# Patient Record
Sex: MALE | Race: WHITE | URBAN - METROPOLITAN AREA
[De-identification: names, ages, dates, MRNs, and addresses within clinical notes are randomized per-mention and may not be internally consistent; named-entity substitution may affect disease eponyms.]

---

## 2017-02-16 ENCOUNTER — COMMUNICATION - HEALTHEAST (OUTPATIENT)
Dept: INTERNAL MEDICINE | Facility: CLINIC | Age: 57
End: 2017-02-16

## 2017-04-07 ENCOUNTER — COMMUNICATION - HEALTHEAST (OUTPATIENT)
Dept: INTERNAL MEDICINE | Facility: CLINIC | Age: 57
End: 2017-04-07

## 2017-04-07 DIAGNOSIS — Z12.11 SCREEN FOR COLON CANCER: ICD-10-CM

## 2017-06-20 ENCOUNTER — RECORDS - HEALTHEAST (OUTPATIENT)
Dept: ADMINISTRATIVE | Facility: OTHER | Age: 57
End: 2017-06-20

## 2017-08-04 ENCOUNTER — COMMUNICATION - HEALTHEAST (OUTPATIENT)
Dept: INTERNAL MEDICINE | Facility: CLINIC | Age: 57
End: 2017-08-04

## 2017-11-13 ENCOUNTER — COMMUNICATION - HEALTHEAST (OUTPATIENT)
Dept: INTERNAL MEDICINE | Facility: CLINIC | Age: 57
End: 2017-11-13

## 2017-11-15 ENCOUNTER — AMBULATORY - HEALTHEAST (OUTPATIENT)
Dept: FAMILY MEDICINE | Facility: CLINIC | Age: 57
End: 2017-11-15

## 2017-11-15 DIAGNOSIS — R56.9 SEIZURE (H): ICD-10-CM

## 2017-11-17 ENCOUNTER — COMMUNICATION - HEALTHEAST (OUTPATIENT)
Dept: INTERNAL MEDICINE | Facility: CLINIC | Age: 57
End: 2017-11-17

## 2018-02-08 ENCOUNTER — COMMUNICATION - HEALTHEAST (OUTPATIENT)
Dept: INTERNAL MEDICINE | Facility: CLINIC | Age: 58
End: 2018-02-08

## 2018-02-08 DIAGNOSIS — R56.9 SEIZURE (H): ICD-10-CM

## 2018-02-14 ENCOUNTER — COMMUNICATION - HEALTHEAST (OUTPATIENT)
Dept: INTERNAL MEDICINE | Facility: CLINIC | Age: 58
End: 2018-02-14

## 2018-02-14 DIAGNOSIS — R56.9 SEIZURE (H): ICD-10-CM

## 2018-02-15 ENCOUNTER — COMMUNICATION - HEALTHEAST (OUTPATIENT)
Dept: INTERNAL MEDICINE | Facility: CLINIC | Age: 58
End: 2018-02-15

## 2018-02-16 ENCOUNTER — COMMUNICATION - HEALTHEAST (OUTPATIENT)
Dept: INTERNAL MEDICINE | Facility: CLINIC | Age: 58
End: 2018-02-16

## 2018-02-27 ENCOUNTER — OFFICE VISIT - HEALTHEAST (OUTPATIENT)
Dept: INTERNAL MEDICINE | Facility: CLINIC | Age: 58
End: 2018-02-27

## 2018-02-27 DIAGNOSIS — Z79.899 MEDICATION MANAGEMENT: ICD-10-CM

## 2018-02-27 DIAGNOSIS — R56.9 SEIZURE (H): ICD-10-CM

## 2018-02-27 LAB
ALBUMIN SERPL-MCNC: 3.8 G/DL (ref 3.5–5)
ALP SERPL-CCNC: 128 U/L (ref 45–120)
ALT SERPL W P-5'-P-CCNC: 21 U/L (ref 0–45)
ANION GAP SERPL CALCULATED.3IONS-SCNC: 7 MMOL/L (ref 5–18)
AST SERPL W P-5'-P-CCNC: 23 U/L (ref 0–40)
BILIRUB SERPL-MCNC: 0.4 MG/DL (ref 0–1)
BUN SERPL-MCNC: 14 MG/DL (ref 8–22)
CALCIUM SERPL-MCNC: 9.5 MG/DL (ref 8.5–10.5)
CHLORIDE BLD-SCNC: 105 MMOL/L (ref 98–107)
CO2 SERPL-SCNC: 29 MMOL/L (ref 22–31)
CREAT SERPL-MCNC: 0.92 MG/DL (ref 0.7–1.3)
ERYTHROCYTE [DISTWIDTH] IN BLOOD BY AUTOMATED COUNT: 12.4 % (ref 11–14.5)
GFR SERPL CREATININE-BSD FRML MDRD: >60 ML/MIN/1.73M2
GLUCOSE BLD-MCNC: 94 MG/DL (ref 70–125)
HCT VFR BLD AUTO: 44.4 % (ref 40–54)
HGB BLD-MCNC: 15.2 G/DL (ref 14–18)
MCH RBC QN AUTO: 32.6 PG (ref 27–34)
MCHC RBC AUTO-ENTMCNC: 34.2 G/DL (ref 32–36)
MCV RBC AUTO: 95 FL (ref 80–100)
PHENYTOIN SERPL-MCNC: 7.1 UG/ML (ref 10–20)
PLATELET # BLD AUTO: 152 THOU/UL (ref 140–440)
PMV BLD AUTO: 7 FL (ref 7–10)
POTASSIUM BLD-SCNC: 4.7 MMOL/L (ref 3.5–5)
PROT SERPL-MCNC: 7 G/DL (ref 6–8)
RBC # BLD AUTO: 4.65 MILL/UL (ref 4.4–6.2)
SODIUM SERPL-SCNC: 141 MMOL/L (ref 136–145)
WBC: 3.5 THOU/UL (ref 4–11)

## 2018-02-28 LAB — 25(OH)D3 SERPL-MCNC: 17.9 NG/ML (ref 30–80)

## 2018-03-14 ENCOUNTER — COMMUNICATION - HEALTHEAST (OUTPATIENT)
Dept: INTERNAL MEDICINE | Facility: CLINIC | Age: 58
End: 2018-03-14

## 2018-03-14 ENCOUNTER — COMMUNICATION - HEALTHEAST (OUTPATIENT)
Dept: TELEHEALTH | Facility: CLINIC | Age: 58
End: 2018-03-14

## 2018-03-14 ENCOUNTER — AMBULATORY - HEALTHEAST (OUTPATIENT)
Dept: LAB | Facility: CLINIC | Age: 58
End: 2018-03-14

## 2018-03-14 DIAGNOSIS — R56.9 SEIZURE (H): ICD-10-CM

## 2018-03-14 DIAGNOSIS — Z79.899 MEDICATION MANAGEMENT: ICD-10-CM

## 2018-03-14 LAB — PHENYTOIN SERPL-MCNC: 11.5 UG/ML (ref 10–20)

## 2018-03-19 ENCOUNTER — COMMUNICATION - HEALTHEAST (OUTPATIENT)
Dept: INTERNAL MEDICINE | Facility: CLINIC | Age: 58
End: 2018-03-19

## 2018-03-19 DIAGNOSIS — R56.9 SEIZURE (H): ICD-10-CM

## 2018-07-03 ENCOUNTER — OFFICE VISIT - HEALTHEAST (OUTPATIENT)
Dept: INTERNAL MEDICINE | Facility: CLINIC | Age: 58
End: 2018-07-03

## 2018-07-03 DIAGNOSIS — L98.9 SKIN LESION: ICD-10-CM

## 2018-07-03 DIAGNOSIS — R56.9 SEIZURE (H): ICD-10-CM

## 2018-07-03 DIAGNOSIS — Z00.00 HEALTH CARE MAINTENANCE: ICD-10-CM

## 2018-07-03 LAB
ALBUMIN SERPL-MCNC: 4.1 G/DL (ref 3.5–5)
ALP SERPL-CCNC: 94 U/L (ref 45–120)
ALT SERPL W P-5'-P-CCNC: 27 U/L (ref 0–45)
ANION GAP SERPL CALCULATED.3IONS-SCNC: 12 MMOL/L (ref 5–18)
AST SERPL W P-5'-P-CCNC: 24 U/L (ref 0–40)
BILIRUB SERPL-MCNC: 0.4 MG/DL (ref 0–1)
BUN SERPL-MCNC: 18 MG/DL (ref 8–22)
CALCIUM SERPL-MCNC: 9.6 MG/DL (ref 8.5–10.5)
CHLORIDE BLD-SCNC: 104 MMOL/L (ref 98–107)
CO2 SERPL-SCNC: 25 MMOL/L (ref 22–31)
CREAT SERPL-MCNC: 0.81 MG/DL (ref 0.7–1.3)
ERYTHROCYTE [DISTWIDTH] IN BLOOD BY AUTOMATED COUNT: 12.4 % (ref 11–14.5)
GFR SERPL CREATININE-BSD FRML MDRD: >60 ML/MIN/1.73M2
GLUCOSE BLD-MCNC: 73 MG/DL (ref 70–125)
HCT VFR BLD AUTO: 44.6 % (ref 40–54)
HGB BLD-MCNC: 15 G/DL (ref 14–18)
MCH RBC QN AUTO: 32.1 PG (ref 27–34)
MCHC RBC AUTO-ENTMCNC: 33.6 G/DL (ref 32–36)
MCV RBC AUTO: 96 FL (ref 80–100)
PHENYTOIN SERPL-MCNC: 9.9 UG/ML (ref 10–20)
PLATELET # BLD AUTO: 148 THOU/UL (ref 140–440)
PMV BLD AUTO: 7 FL (ref 7–10)
POTASSIUM BLD-SCNC: 4.6 MMOL/L (ref 3.5–5)
PROT SERPL-MCNC: 6.9 G/DL (ref 6–8)
PSA SERPL-MCNC: 0.6 NG/ML (ref 0–3.5)
RBC # BLD AUTO: 4.66 MILL/UL (ref 4.4–6.2)
SODIUM SERPL-SCNC: 141 MMOL/L (ref 136–145)
WBC: 4 THOU/UL (ref 4–11)

## 2018-07-03 ASSESSMENT — MIFFLIN-ST. JEOR: SCORE: 1427.49

## 2018-07-04 LAB
25(OH)D3 SERPL-MCNC: 23.2 NG/ML (ref 30–80)
25(OH)D3 SERPL-MCNC: 23.2 NG/ML (ref 30–80)

## 2018-07-26 ENCOUNTER — COMMUNICATION - HEALTHEAST (OUTPATIENT)
Dept: ADMINISTRATIVE | Facility: CLINIC | Age: 58
End: 2018-07-26

## 2018-11-15 ENCOUNTER — COMMUNICATION - HEALTHEAST (OUTPATIENT)
Dept: INTERNAL MEDICINE | Facility: CLINIC | Age: 58
End: 2018-11-15

## 2018-11-15 DIAGNOSIS — R56.9 SEIZURE (H): ICD-10-CM

## 2018-12-19 ENCOUNTER — COMMUNICATION - HEALTHEAST (OUTPATIENT)
Dept: INTERNAL MEDICINE | Facility: CLINIC | Age: 58
End: 2018-12-19

## 2018-12-19 DIAGNOSIS — R56.9 SEIZURE (H): ICD-10-CM

## 2019-01-26 ENCOUNTER — COMMUNICATION - HEALTHEAST (OUTPATIENT)
Dept: SCHEDULING | Facility: CLINIC | Age: 59
End: 2019-01-26

## 2019-01-26 DIAGNOSIS — R56.9 SEIZURE (H): ICD-10-CM

## 2019-04-20 ENCOUNTER — COMMUNICATION - HEALTHEAST (OUTPATIENT)
Dept: SCHEDULING | Facility: CLINIC | Age: 59
End: 2019-04-20

## 2019-04-20 DIAGNOSIS — R56.9 SEIZURE (H): ICD-10-CM

## 2019-05-19 ENCOUNTER — COMMUNICATION - HEALTHEAST (OUTPATIENT)
Dept: SCHEDULING | Facility: CLINIC | Age: 59
End: 2019-05-19

## 2019-05-19 DIAGNOSIS — R56.9 SEIZURE (H): ICD-10-CM

## 2019-06-18 ENCOUNTER — COMMUNICATION - HEALTHEAST (OUTPATIENT)
Dept: SCHEDULING | Facility: CLINIC | Age: 59
End: 2019-06-18

## 2019-06-18 DIAGNOSIS — R56.9 SEIZURE (H): ICD-10-CM

## 2019-06-19 ENCOUNTER — COMMUNICATION - HEALTHEAST (OUTPATIENT)
Dept: INTERNAL MEDICINE | Facility: CLINIC | Age: 59
End: 2019-06-19

## 2019-06-19 DIAGNOSIS — S42.031A CLOSED DISPLACED FRACTURE OF ACROMIAL END OF RIGHT CLAVICLE, INITIAL ENCOUNTER: ICD-10-CM

## 2019-06-21 ENCOUNTER — OFFICE VISIT - HEALTHEAST (OUTPATIENT)
Dept: INTERNAL MEDICINE | Facility: CLINIC | Age: 59
End: 2019-06-21

## 2019-06-21 ENCOUNTER — RECORDS - HEALTHEAST (OUTPATIENT)
Dept: GENERAL RADIOLOGY | Facility: CLINIC | Age: 59
End: 2019-06-21

## 2019-06-21 DIAGNOSIS — S42.001S CLOSED NONDISPLACED FRACTURE OF RIGHT CLAVICLE, UNSPECIFIED PART OF CLAVICLE, SEQUELA: ICD-10-CM

## 2019-06-21 DIAGNOSIS — S29.9XXD: ICD-10-CM

## 2019-06-21 DIAGNOSIS — S29.9XXD CHEST INJURY, SUBSEQUENT ENCOUNTER: ICD-10-CM

## 2019-07-02 ENCOUNTER — RECORDS - HEALTHEAST (OUTPATIENT)
Dept: ADMINISTRATIVE | Facility: OTHER | Age: 59
End: 2019-07-02

## 2019-07-17 ENCOUNTER — COMMUNICATION - HEALTHEAST (OUTPATIENT)
Dept: SCHEDULING | Facility: CLINIC | Age: 59
End: 2019-07-17

## 2019-07-17 ENCOUNTER — OFFICE VISIT - HEALTHEAST (OUTPATIENT)
Dept: INTERNAL MEDICINE | Facility: CLINIC | Age: 59
End: 2019-07-17

## 2019-07-17 DIAGNOSIS — S42.001K CLOSED DISPLACED FRACTURE OF RIGHT CLAVICLE WITH NONUNION, UNSPECIFIED PART OF CLAVICLE, SUBSEQUENT ENCOUNTER: ICD-10-CM

## 2019-07-17 DIAGNOSIS — G40.909 NONINTRACTABLE EPILEPSY WITHOUT STATUS EPILEPTICUS, UNSPECIFIED EPILEPSY TYPE (H): ICD-10-CM

## 2019-07-17 DIAGNOSIS — R56.9 SEIZURE (H): ICD-10-CM

## 2019-07-17 DIAGNOSIS — Z01.818 PREOPERATIVE EXAMINATION: ICD-10-CM

## 2019-07-17 LAB
ALBUMIN SERPL-MCNC: 4.2 G/DL (ref 3.5–5)
ALP SERPL-CCNC: 132 U/L (ref 45–120)
ALT SERPL W P-5'-P-CCNC: 30 U/L (ref 0–45)
ANION GAP SERPL CALCULATED.3IONS-SCNC: 9 MMOL/L (ref 5–18)
AST SERPL W P-5'-P-CCNC: 23 U/L (ref 0–40)
BILIRUB SERPL-MCNC: 0.3 MG/DL (ref 0–1)
BUN SERPL-MCNC: 13 MG/DL (ref 8–22)
CALCIUM SERPL-MCNC: 9.9 MG/DL (ref 8.5–10.5)
CHLORIDE BLD-SCNC: 103 MMOL/L (ref 98–107)
CO2 SERPL-SCNC: 30 MMOL/L (ref 22–31)
CREAT SERPL-MCNC: 0.82 MG/DL (ref 0.7–1.3)
ERYTHROCYTE [DISTWIDTH] IN BLOOD BY AUTOMATED COUNT: 11.9 % (ref 11–14.5)
GFR SERPL CREATININE-BSD FRML MDRD: >60 ML/MIN/1.73M2
GLUCOSE BLD-MCNC: 93 MG/DL (ref 70–125)
HCT VFR BLD AUTO: 43.7 % (ref 40–54)
HGB BLD-MCNC: 14.8 G/DL (ref 14–18)
MCH RBC QN AUTO: 32.3 PG (ref 27–34)
MCHC RBC AUTO-ENTMCNC: 33.7 G/DL (ref 32–36)
MCV RBC AUTO: 96 FL (ref 80–100)
PHENYTOIN SERPL-MCNC: 22.8 UG/ML (ref 10–20)
PLATELET # BLD AUTO: 141 THOU/UL (ref 140–440)
PMV BLD AUTO: 7.2 FL (ref 7–10)
POTASSIUM BLD-SCNC: 5 MMOL/L (ref 3.5–5)
PROT SERPL-MCNC: 6.9 G/DL (ref 6–8)
RBC # BLD AUTO: 4.57 MILL/UL (ref 4.4–6.2)
SODIUM SERPL-SCNC: 142 MMOL/L (ref 136–145)
WBC: 4.4 THOU/UL (ref 4–11)

## 2019-07-17 ASSESSMENT — MIFFLIN-ST. JEOR: SCORE: 1418.19

## 2019-07-18 ENCOUNTER — COMMUNICATION - HEALTHEAST (OUTPATIENT)
Dept: INTERNAL MEDICINE | Facility: CLINIC | Age: 59
End: 2019-07-18

## 2019-08-01 ENCOUNTER — RECORDS - HEALTHEAST (OUTPATIENT)
Dept: ADMINISTRATIVE | Facility: OTHER | Age: 59
End: 2019-08-01

## 2019-08-13 ENCOUNTER — RECORDS - HEALTHEAST (OUTPATIENT)
Dept: ADMINISTRATIVE | Facility: OTHER | Age: 59
End: 2019-08-13

## 2019-08-18 ENCOUNTER — COMMUNICATION - HEALTHEAST (OUTPATIENT)
Dept: SCHEDULING | Facility: CLINIC | Age: 59
End: 2019-08-18

## 2019-08-18 DIAGNOSIS — R56.9 SEIZURE (H): ICD-10-CM

## 2019-09-19 ENCOUNTER — COMMUNICATION - HEALTHEAST (OUTPATIENT)
Dept: SCHEDULING | Facility: CLINIC | Age: 59
End: 2019-09-19

## 2019-09-19 DIAGNOSIS — R56.9 SEIZURE (H): ICD-10-CM

## 2019-09-24 ENCOUNTER — RECORDS - HEALTHEAST (OUTPATIENT)
Dept: ADMINISTRATIVE | Facility: OTHER | Age: 59
End: 2019-09-24

## 2019-11-08 ENCOUNTER — COMMUNICATION - HEALTHEAST (OUTPATIENT)
Dept: INTERNAL MEDICINE | Facility: CLINIC | Age: 59
End: 2019-11-08

## 2019-11-26 ENCOUNTER — RECORDS - HEALTHEAST (OUTPATIENT)
Dept: ADMINISTRATIVE | Facility: OTHER | Age: 59
End: 2019-11-26

## 2019-12-13 ENCOUNTER — OFFICE VISIT - HEALTHEAST (OUTPATIENT)
Dept: INTERNAL MEDICINE | Facility: CLINIC | Age: 59
End: 2019-12-13

## 2019-12-13 DIAGNOSIS — Z23 NEED FOR INFLUENZA VACCINATION: ICD-10-CM

## 2019-12-13 DIAGNOSIS — Z00.00 HEALTHCARE MAINTENANCE: ICD-10-CM

## 2019-12-13 DIAGNOSIS — Z00.00 ANNUAL PHYSICAL EXAM: ICD-10-CM

## 2019-12-13 DIAGNOSIS — G40.909 NONINTRACTABLE EPILEPSY WITHOUT STATUS EPILEPTICUS, UNSPECIFIED EPILEPSY TYPE (H): ICD-10-CM

## 2019-12-13 LAB
LDLC SERPL CALC-MCNC: 94 MG/DL
PSA SERPL-MCNC: 0.5 NG/ML (ref 0–3.5)

## 2019-12-13 ASSESSMENT — MIFFLIN-ST. JEOR: SCORE: 1442.68

## 2020-05-21 ENCOUNTER — COMMUNICATION - HEALTHEAST (OUTPATIENT)
Dept: SCHEDULING | Facility: CLINIC | Age: 60
End: 2020-05-21

## 2020-05-26 ENCOUNTER — OFFICE VISIT - HEALTHEAST (OUTPATIENT)
Dept: INTERNAL MEDICINE | Facility: CLINIC | Age: 60
End: 2020-05-26

## 2020-05-26 DIAGNOSIS — G40.909 NONINTRACTABLE EPILEPSY WITHOUT STATUS EPILEPTICUS, UNSPECIFIED EPILEPSY TYPE (H): ICD-10-CM

## 2020-09-14 ENCOUNTER — COMMUNICATION - HEALTHEAST (OUTPATIENT)
Dept: SCHEDULING | Facility: CLINIC | Age: 60
End: 2020-09-14

## 2020-09-14 DIAGNOSIS — R56.9 SEIZURE (H): ICD-10-CM

## 2020-09-16 RX ORDER — PHENYTOIN SODIUM 100 MG/1
CAPSULE, EXTENDED RELEASE ORAL
Qty: 270 CAPSULE | Refills: 3 | Status: SHIPPED | OUTPATIENT
Start: 2020-09-16 | End: 2022-09-14

## 2021-05-23 ENCOUNTER — HEALTH MAINTENANCE LETTER (OUTPATIENT)
Age: 61
End: 2021-05-23

## 2021-05-28 NOTE — TELEPHONE ENCOUNTER
RN cannot approve Refill Request    RN can NOT refill this medication overdue for office visits and/or labs.    Bryant Nails, [facility] Triage/Med Refill 4/22/2019    Requested Prescriptions   Pending Prescriptions Disp Refills     DILANTIN EXTENDED 100 mg ER capsule [Pharmacy Med Name: DILANTIN 100MG CAPSULES] 90 capsule 0     Sig: TAKE 3 CAPSULES BY MOUTH ONCE DAILY       Phenytoin Refill Protocol Failed - 4/20/2019  9:03 AM        Failed - Free phenytoin level in last 12 months     No results found for: PHENYTOIFREE          Failed - Folate level in last 12 months     No results found for: FOLATE          Passed - LFT or AST or ALT in last 12 months     Albumin   Date Value Ref Range Status   07/03/2018 4.1 3.5 - 5.0 g/dL Final     Bilirubin, Total   Date Value Ref Range Status   07/03/2018 0.4 0.0 - 1.0 mg/dL Final     Alkaline Phosphatase   Date Value Ref Range Status   07/03/2018 94 45 - 120 U/L Final     AST   Date Value Ref Range Status   07/03/2018 24 0 - 40 U/L Final     ALT   Date Value Ref Range Status   07/03/2018 27 0 - 45 U/L Final     Protein, Total   Date Value Ref Range Status   07/03/2018 6.9 6.0 - 8.0 g/dL Final                Passed - CBC w/o diff (Hemogram 2) in last year      WBC   Date Value Ref Range Status   07/03/2018 4.0 4.0 - 11.0 thou/uL Final     RBC   Date Value Ref Range Status   07/03/2018 4.66 4.40 - 6.20 mill/uL Final     Hemoglobin   Date Value Ref Range Status   07/03/2018 15.0 14.0 - 18.0 g/dL Final     Hematocrit   Date Value Ref Range Status   07/03/2018 44.6 40.0 - 54.0 % Final     MCV   Date Value Ref Range Status   07/03/2018 96 80 - 100 fL Final     MCH   Date Value Ref Range Status   07/03/2018 32.1 27.0 - 34.0 pg Final     MCHC   Date Value Ref Range Status   07/03/2018 33.6 32.0 - 36.0 g/dL Final     RDW   Date Value Ref Range Status   07/03/2018 12.4 11.0 - 14.5 % Final     Platelets   Date Value Ref Range Status   07/03/2018 148 140 - 440 thou/uL Final      MPV   Date Value Ref Range Status   07/03/2018 7.0 7.0 - 10.0 fL Final                Passed - PCP or prescribing provider visit in past 12 months       Last office visit with prescriber/PCP: Visit date not found OR same dept: Visit date not found OR same specialty: Visit date not found  Last physical: 7/3/2018 Last MTM visit: Visit date not found   Next visit within 3 mo: Visit date not found  Next physical within 3 mo: Visit date not found  Prescriber OR PCP: Debora Ring MD  Last diagnosis associated with med order: 1. Seizure (H)  - DILANTIN EXTENDED 100 mg ER capsule [Pharmacy Med Name: DILANTIN 100MG CAPSULES]; TAKE 3 CAPSULES BY MOUTH ONCE DAILY  Dispense: 90 capsule; Refill: 0    If protocol passes may refill for 12 months if within 3 months of last provider visit (or a total of 15 months).

## 2021-05-28 NOTE — TELEPHONE ENCOUNTER
RN cannot approve Refill Request    RN can NOT refill this medication Protocol failed and NO refill given.         Kalie Jones, [facility] Triage/Med Refill 5/20/2019    Requested Prescriptions   Pending Prescriptions Disp Refills     DILANTIN EXTENDED 100 mg ER capsule [Pharmacy Med Name: DILANTIN 100MG CAPSULES] 90 capsule 0     Sig: TAKE 3 CAPSULES BY MOUTH ONCE DAILY       Phenytoin Refill Protocol Failed - 5/19/2019  8:11 AM        Failed - Free phenytoin level in last 12 months     No results found for: PHENYTOIFREE          Failed - Folate level in last 12 months     No results found for: FOLATE          Passed - LFT or AST or ALT in last 12 months     Albumin   Date Value Ref Range Status   07/03/2018 4.1 3.5 - 5.0 g/dL Final     Bilirubin, Total   Date Value Ref Range Status   07/03/2018 0.4 0.0 - 1.0 mg/dL Final     Alkaline Phosphatase   Date Value Ref Range Status   07/03/2018 94 45 - 120 U/L Final     AST   Date Value Ref Range Status   07/03/2018 24 0 - 40 U/L Final     ALT   Date Value Ref Range Status   07/03/2018 27 0 - 45 U/L Final     Protein, Total   Date Value Ref Range Status   07/03/2018 6.9 6.0 - 8.0 g/dL Final                Passed - CBC w/o diff (Hemogram 2) in last year      WBC   Date Value Ref Range Status   07/03/2018 4.0 4.0 - 11.0 thou/uL Final     RBC   Date Value Ref Range Status   07/03/2018 4.66 4.40 - 6.20 mill/uL Final     Hemoglobin   Date Value Ref Range Status   07/03/2018 15.0 14.0 - 18.0 g/dL Final     Hematocrit   Date Value Ref Range Status   07/03/2018 44.6 40.0 - 54.0 % Final     MCV   Date Value Ref Range Status   07/03/2018 96 80 - 100 fL Final     MCH   Date Value Ref Range Status   07/03/2018 32.1 27.0 - 34.0 pg Final     MCHC   Date Value Ref Range Status   07/03/2018 33.6 32.0 - 36.0 g/dL Final     RDW   Date Value Ref Range Status   07/03/2018 12.4 11.0 - 14.5 % Final     Platelets   Date Value Ref Range Status   07/03/2018 148 140 - 440 thou/uL Final      MPV   Date Value Ref Range Status   07/03/2018 7.0 7.0 - 10.0 fL Final                Passed - PCP or prescribing provider visit in past 12 months       Last office visit with prescriber/PCP: Visit date not found OR same dept: Visit date not found OR same specialty: Visit date not found  Last physical: 7/3/2018 Last MTM visit: Visit date not found   Next visit within 3 mo: Visit date not found  Next physical within 3 mo: Visit date not found  Prescriber OR PCP: Debora Ring MD  Last diagnosis associated with med order: 1. Seizure (H)  - DILANTIN EXTENDED 100 mg ER capsule [Pharmacy Med Name: DILANTIN 100MG CAPSULES]; TAKE 3 CAPSULES BY MOUTH ONCE DAILY  Dispense: 90 capsule; Refill: 0    If protocol passes may refill for 12 months if within 3 months of last provider visit (or a total of 15 months).

## 2021-05-29 NOTE — TELEPHONE ENCOUNTER
Controlled Substance Refill Request  Medication Name:   Requested Prescriptions     Pending Prescriptions Disp Refills     oxyCODONE-acetaminophen (PERCOCET/ENDOCET) 5-325 mg per tablet 13 tablet 0     Sig: Take 1 tablet by mouth every 6 (six) hours as needed for pain.     Date Last Fill: 6/8/2019  Pharmacy: [facility] [address] [place] [address]       Submit electronically to pharmacy  Controlled Substance Agreement Date Scanned:   Encounter-Level CSA Scan Date:    There are no encounter-level csa scan date.       Last office visit with prescriber/PCP: Visit date not found OR same dept: Visit date not found OR same specialty: 2/27/2018 Osiel Olson CNP  Last physical: 7/3/2018 Last MTM visit: Visit date not found      The patient would like to demetrio the request as high priority due to the patient being out of the medication. The patient states that his pain is still 8/10 at night. The patient states that he is taking Aleve during the day and it is helping some and rates his pain 5-6/10.

## 2021-05-29 NOTE — TELEPHONE ENCOUNTER
RN cannot approve Refill Request    RN can NOT refill this medication overdue for office visits and/or labs.    Bryant Nails, [facility] Triage/Med Refill 6/18/2019    Requested Prescriptions   Pending Prescriptions Disp Refills     DILANTIN EXTENDED 100 mg ER capsule [Pharmacy Med Name: DILANTIN 100MG CAPSULES] 90 capsule 0     Sig: TAKE 3 CAPSULES BY MOUTH ONCE DAILY       Phenytoin Refill Protocol Failed - 6/18/2019  6:23 AM        Failed - Free phenytoin level in last 12 months     No results found for: PHENYTOIFREE          Failed - Folate level in last 12 months     No results found for: FOLATE          Passed - LFT or AST or ALT in last 12 months     Albumin   Date Value Ref Range Status   07/03/2018 4.1 3.5 - 5.0 g/dL Final     Bilirubin, Total   Date Value Ref Range Status   07/03/2018 0.4 0.0 - 1.0 mg/dL Final     Alkaline Phosphatase   Date Value Ref Range Status   07/03/2018 94 45 - 120 U/L Final     AST   Date Value Ref Range Status   07/03/2018 24 0 - 40 U/L Final     ALT   Date Value Ref Range Status   07/03/2018 27 0 - 45 U/L Final     Protein, Total   Date Value Ref Range Status   07/03/2018 6.9 6.0 - 8.0 g/dL Final                Passed - CBC w/o diff (Hemogram 2) in last year      WBC   Date Value Ref Range Status   07/03/2018 4.0 4.0 - 11.0 thou/uL Final     RBC   Date Value Ref Range Status   07/03/2018 4.66 4.40 - 6.20 mill/uL Final     Hemoglobin   Date Value Ref Range Status   07/03/2018 15.0 14.0 - 18.0 g/dL Final     Hematocrit   Date Value Ref Range Status   07/03/2018 44.6 40.0 - 54.0 % Final     MCV   Date Value Ref Range Status   07/03/2018 96 80 - 100 fL Final     MCH   Date Value Ref Range Status   07/03/2018 32.1 27.0 - 34.0 pg Final     MCHC   Date Value Ref Range Status   07/03/2018 33.6 32.0 - 36.0 g/dL Final     RDW   Date Value Ref Range Status   07/03/2018 12.4 11.0 - 14.5 % Final     Platelets   Date Value Ref Range Status   07/03/2018 148 140 - 440 thou/uL Final      MPV   Date Value Ref Range Status   07/03/2018 7.0 7.0 - 10.0 fL Final                Passed - PCP or prescribing provider visit in past 12 months       Last office visit with prescriber/PCP: Visit date not found OR same dept: Visit date not found OR same specialty: Visit date not found  Last physical: 7/3/2018 Last MTM visit: Visit date not found   Next visit within 3 mo: Visit date not found  Next physical within 3 mo: Visit date not found  Prescriber OR PCP: Debora Ring MD  Last diagnosis associated with med order: 1. Seizure (H)  - DILANTIN EXTENDED 100 mg ER capsule [Pharmacy Med Name: DILANTIN 100MG CAPSULES]; TAKE 3 CAPSULES BY MOUTH ONCE DAILY  Dispense: 90 capsule; Refill: 0    If protocol passes may refill for 12 months if within 3 months of last provider visit (or a total of 15 months).

## 2021-05-30 NOTE — PATIENT INSTRUCTIONS - HE
Hold all supplements, aspirin and NSAIDs for 7 days prior to surgery.  Follow your surgeon's direction on when to stop eating and drinking prior to surgery.  Your surgeon will be managing your pain after your surgery.        Take Dilantin with small sip of water on the morning of surgery.   intermittent

## 2021-05-30 NOTE — PROGRESS NOTES
Preoperative Exam    Scheduled Procedure5:Right Clavicle Fracture  Surgery Date:  7-  Surgery Location: [facility], fax [phone]    Surgeon:      Assessment/Plan:     1. Preoperative examination    - HM2(CBC w/o Differential)  - Comprehensive Metabolic Panel    2. 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 He was using arm sling to immobilize the fracture. He saw [facility] and surgery is recommended because of the nonunion. Pain is controlled with occasional use of Percocet. Significant restriction in range of motion of the right shoulder and arm.    3. Nonintractable epilepsy without status epilepticus, unspecified epilepsy type (H)  On dilantin, will take am of surgery.  - Phenytoin (Dilantin )     Surgical Procedure Risk: Low (reported cardiac risk generally < 1%)  Have you had prior anesthesia?: Yes  Have you or any family members had a previous anesthesia reaction:  No  Do you or any family members have a history of a clotting or bleeding disorder?:No   Cardiac Risk Assessment: no increased risk for major cardiac complications    APPROVAL GIVEN to proceed with proposed procedure, without further diagnostic evaluation        Functional Status: Independent  Patient plans to recover at home with family.     Subjective:      Zaki Sultana is a 58 y.o. male who presents for a preoperative consultation.  See above.    All other systems reviewed and are negative, other than those listed in the HPI.    Pertinent History  Do you have difficulty breathing or chest pain after walking up a flight of stairs: No  History of obstructive sleep apnea: No  Steroid use in the last 6 months: No  Frequent Aspirin/NSAID use: No  Prior Blood Transfusion: Yes: In Mid 70's Past Surgry  Prior Blood Transfusion Reaction: No  If for some  reason prior to, during or after the procedure, if it is medically indicated, would you be willing to have a blood transfusion?:  There is no transfusion refusal.    Current Outpatient Medications   Medication Sig Dispense Refill     DILANTIN EXTENDED 100 mg ER capsule TAKE 3 CAPSULES BY MOUTH ONCE DAILY 90 capsule 0     oxyCODONE-acetaminophen (PERCOCET/ENDOCET) 5-325 mg per tablet Take 1 tablet by mouth every 6 (six) hours as needed for pain. 13 tablet 0     No current facility-administered medications for this visit.         Allergies   Allergen Reactions     Penicillins      Phenytoin Sodium Extended      Seizures  - Only there generic causes this       Patient Active Problem List   Diagnosis     Epilepsy       Past Medical History:   Diagnosis Date     Benign Polyps Of The Large Intestine     Created by Conversion [facility] Annotation: Apr 20 2011 10:27AM - Debora Ring: colonoscopy  done 4/15/11      Epilepsy     Created by Conversion        Past Surgical History:   Procedure Laterality Date     INTRACRANIAL ANEURYSM REPAIR         Social History     Socioeconomic History     Marital status:      Spouse name: Not on file     Number of children: Not on file     Years of education: Not on file     Highest education level: Not on file   Occupational History     Not on file   Social Needs     Financial resource strain: Not on file     Food insecurity:     Worry: Not on file     Inability: Not on file     Transportation needs:     Medical: Not on file     Non-medical: Not on file   Tobacco Use     Smoking status: Never Smoker     Smokeless tobacco: Never Used   Substance and Sexual Activity     Alcohol use: Yes     Alcohol/week: 0.6 - 1.2 oz     Types: 1 - 2 Cans of beer per week     Drug use: No     Sexual activity: Yes     Partners: Female     Birth control/protection: None   Lifestyle     Physical activity:     Days per week: Not on file     Minutes per session: Not on file     Stress: Not on file  "  Relationships     Social connections:     Talks on phone: Not on file     Gets together: Not on file     Attends Alevism service: Not on file     Active member of club or organization: Not on file     Attends meetings of clubs or organizations: Not on file     Relationship status: Not on file     Intimate partner violence:     Fear of current or ex partner: Not on file     Emotionally abused: Not on file     Physically abused: Not on file     Forced sexual activity: Not on file   Other Topics Concern     Not on file   Social History Narrative     Not on file             Objective:     Vitals:    07/17/19 1240   BP: 103/68   Pulse: 60   SpO2: 98%   Weight: 138 lb 9.6 oz (62.9 kg)   Height: 5' 8\" (1.727 m)         Physical Exam:  Constitutional:  oriented to person, place, and time, appears well-nourished. No distress.   HENT:   Head: Normocephalic.   Mouth/Throat: Oropharynx is clear and moist.   Eyes: Conjunctivae are normal. Pupils are equal, round, and reactive to light.   Neck: Normal range of motion. Neck supple.   Cardiovascular: Normal rate, regular rhythm and normal heart sounds.    Pulmonary/Chest: Effort normal and breath sounds normal.   Abdominal: Soft. Bowel sounds are normal.   Musculoskeletal: Normal range of motion.   Neurological: alert and oriented to person, place, and time. Skin: Skin is warm.   Psychiatric: normal mood and affect.      Patient Instructions     Hold all supplements, aspirin and NSAIDs for 7 days prior to surgery.  Follow your surgeon's direction on when to stop eating and drinking prior to surgery.  Your surgeon will be managing your pain after your surgery.        Take Dilantin with small sip of water on the morning of surgery.          Labs:  Labs pending at this time.  Results will be reviewed when available.    Immunization History   Administered Date(s) Administered     Influenza, Seasonal, Inj PF IIV3 11/03/2011     Td,adult,historic,unspecified 09/09/2008     Tdap " 09/09/2008, 05/04/2015           Electronically signed by Debora Ring MD 07/17/19 8:40 AM

## 2021-05-30 NOTE — TELEPHONE ENCOUNTER
RN cannot approve Refill Request    RN can NOT refill this medication Protocol failed and NO refill given.      Kalie Jones, [facility] Triage/Med Refill 7/17/2019    Requested Prescriptions   Pending Prescriptions Disp Refills     DILANTIN EXTENDED 100 mg ER capsule [Pharmacy Med Name: DILANTIN 100MG CAPSULES] 90 capsule 0     Sig: TAKE 3 CAPSULES BY MOUTH ONCE DAILY       Phenytoin Refill Protocol Failed - 7/17/2019  5:26 AM        Failed - LFT or AST or ALT in last 12 months     Albumin   Date Value Ref Range Status   07/03/2018 4.1 3.5 - 5.0 g/dL Final     Bilirubin, Total   Date Value Ref Range Status   07/03/2018 0.4 0.0 - 1.0 mg/dL Final     Alkaline Phosphatase   Date Value Ref Range Status   07/03/2018 94 45 - 120 U/L Final     AST   Date Value Ref Range Status   07/03/2018 24 0 - 40 U/L Final     ALT   Date Value Ref Range Status   07/03/2018 27 0 - 45 U/L Final     Protein, Total   Date Value Ref Range Status   07/03/2018 6.9 6.0 - 8.0 g/dL Final                Failed - CBC w/o diff (Hemogram 2) in last year      WBC   Date Value Ref Range Status   07/03/2018 4.0 4.0 - 11.0 thou/uL Final     RBC   Date Value Ref Range Status   07/03/2018 4.66 4.40 - 6.20 mill/uL Final     Hemoglobin   Date Value Ref Range Status   07/03/2018 15.0 14.0 - 18.0 g/dL Final     Hematocrit   Date Value Ref Range Status   07/03/2018 44.6 40.0 - 54.0 % Final     MCV   Date Value Ref Range Status   07/03/2018 96 80 - 100 fL Final     MCH   Date Value Ref Range Status   07/03/2018 32.1 27.0 - 34.0 pg Final     MCHC   Date Value Ref Range Status   07/03/2018 33.6 32.0 - 36.0 g/dL Final     RDW   Date Value Ref Range Status   07/03/2018 12.4 11.0 - 14.5 % Final     Platelets   Date Value Ref Range Status   07/03/2018 148 140 - 440 thou/uL Final     MPV   Date Value Ref Range Status   07/03/2018 7.0 7.0 - 10.0 fL Final                Failed - Free phenytoin level in last 12 months     No results found for: PHENYTOIFREE           Failed - Folate level in last 12 months     No results found for: FOLATE          Passed - PCP or prescribing provider visit in past 12 months       Last office visit with prescriber/PCP: 6/21/2019 Debora Ring MD OR same dept: Visit date not found OR same specialty: Visit date not found  Last physical: 7/3/2018 Last MTM visit: Visit date not found   Next visit within 3 mo: Visit date not found  Next physical within 3 mo: 7/17/2019 Debora Ring MD  Prescriber OR PCP: Debora Ring MD  Last diagnosis associated with med order: 1. Seizure (H)  - DILANTIN EXTENDED 100 mg ER capsule [Pharmacy Med Name: DILANTIN 100MG CAPSULES]; TAKE 3 CAPSULES BY MOUTH ONCE DAILY  Dispense: 90 capsule; Refill: 0    If protocol passes may refill for 12 months if within 3 months of last provider visit (or a total of 15 months).

## 2021-05-31 NOTE — TELEPHONE ENCOUNTER
RN cannot approve Refill Request    RN can NOT refill this medication Protocol failed and NO refill given. Last office visit: 6/21/2019 Debora Ring MD Last Physical: 7/17/2019 Last MTM visit: Visit date not found Last visit same specialty: Visit date not found.  Next visit within 3 mo: Visit date not found  Next physical within 3 mo: Visit date not found      Zee Acevedo, [facility] Triage/Med Refill 8/19/2019    Requested Prescriptions   Pending Prescriptions Disp Refills     DILANTIN EXTENDED 100 mg ER capsule [Pharmacy Med Name: DILANTIN 100MG CAPSULES] 90 capsule 0     Sig: TAKE 3 CAPSULES BY MOUTH ONCE DAILY       Phenytoin Refill Protocol Failed - 8/18/2019  4:20 PM        Failed - Free phenytoin level in last 12 months     No results found for: PHENYTOIFREE          Failed - Folate level in last 12 months     No results found for: FOLATE          Passed - LFT or AST or ALT in last 12 months     Albumin   Date Value Ref Range Status   07/17/2019 4.2 3.5 - 5.0 g/dL Final     Bilirubin, Total   Date Value Ref Range Status   07/17/2019 0.3 0.0 - 1.0 mg/dL Final     Alkaline Phosphatase   Date Value Ref Range Status   07/17/2019 132 (H) 45 - 120 U/L Final     AST   Date Value Ref Range Status   07/17/2019 23 0 - 40 U/L Final     ALT   Date Value Ref Range Status   07/17/2019 30 0 - 45 U/L Final     Protein, Total   Date Value Ref Range Status   07/17/2019 6.9 6.0 - 8.0 g/dL Final                Passed - CBC w/o diff (Hemogram 2) in last year      WBC   Date Value Ref Range Status   07/17/2019 4.4 4.0 - 11.0 thou/uL Final     RBC   Date Value Ref Range Status   07/17/2019 4.57 4.40 - 6.20 mill/uL Final     Hemoglobin   Date Value Ref Range Status   07/17/2019 14.8 14.0 - 18.0 g/dL Final     Hematocrit   Date Value Ref Range Status   07/17/2019 43.7 40.0 - 54.0 % Final     MCV   Date Value Ref Range Status   07/17/2019 96 80 - 100 fL Final     MCH   Date Value Ref Range Status   07/17/2019 32.3 27.0  - 34.0 pg Final     MCHC   Date Value Ref Range Status   07/17/2019 33.7 32.0 - 36.0 g/dL Final     RDW   Date Value Ref Range Status   07/17/2019 11.9 11.0 - 14.5 % Final     Platelets   Date Value Ref Range Status   07/17/2019 141 140 - 440 thou/uL Final     MPV   Date Value Ref Range Status   07/17/2019 7.2 7.0 - 10.0 fL Final                Passed - PCP or prescribing provider visit in past 12 months       Last office visit with prescriber/PCP: 6/21/2019 Debora Ring MD OR same dept: Visit date not found OR same specialty: Visit date not found  Last physical: 7/17/2019 Last MTM visit: Visit date not found   Next visit within 3 mo: Visit date not found  Next physical within 3 mo: Visit date not found  Prescriber OR PCP: Debora Ring MD  Last diagnosis associated with med order: 1. Seizure (H)  - DILANTIN EXTENDED 100 mg ER capsule [Pharmacy Med Name: DILANTIN 100MG CAPSULES]; TAKE 3 CAPSULES BY MOUTH ONCE DAILY  Dispense: 90 capsule; Refill: 0    If protocol passes may refill for 12 months if within 3 months of last provider visit (or a total of 15 months).

## 2021-06-01 VITALS — HEIGHT: 67 IN | BODY MASS INDEX: 22.9 KG/M2 | WEIGHT: 145.9 LBS

## 2021-06-01 VITALS — WEIGHT: 151.6 LBS | BODY MASS INDEX: 23.74 KG/M2

## 2021-06-01 NOTE — TELEPHONE ENCOUNTER
RN cannot approve Refill Request    RN can NOT refill this medication Protocol failed and NO refill given.       Kalie Jones, [facility] Triage/Med Refill 9/19/2019    Requested Prescriptions   Pending Prescriptions Disp Refills     DILANTIN EXTENDED 100 mg ER capsule [Pharmacy Med Name: DILANTIN 100MG CAPSULES] 90 capsule 0     Sig: TAKE 3 CAPSULES BY MOUTH ONCE DAILY       Phenytoin Refill Protocol Failed - 9/19/2019  6:19 AM        Failed - Free phenytoin level in last 12 months     No results found for: PHENYTOIFREE          Failed - Folate level in last 12 months     No results found for: FOLATE          Passed - LFT or AST or ALT in last 12 months     Albumin   Date Value Ref Range Status   07/17/2019 4.2 3.5 - 5.0 g/dL Final     Bilirubin, Total   Date Value Ref Range Status   07/17/2019 0.3 0.0 - 1.0 mg/dL Final     Alkaline Phosphatase   Date Value Ref Range Status   07/17/2019 132 (H) 45 - 120 U/L Final     AST   Date Value Ref Range Status   07/17/2019 23 0 - 40 U/L Final     ALT   Date Value Ref Range Status   07/17/2019 30 0 - 45 U/L Final     Protein, Total   Date Value Ref Range Status   07/17/2019 6.9 6.0 - 8.0 g/dL Final                Passed - CBC w/o diff (Hemogram 2) in last year      WBC   Date Value Ref Range Status   07/17/2019 4.4 4.0 - 11.0 thou/uL Final     RBC   Date Value Ref Range Status   07/17/2019 4.57 4.40 - 6.20 mill/uL Final     Hemoglobin   Date Value Ref Range Status   07/17/2019 14.8 14.0 - 18.0 g/dL Final     Hematocrit   Date Value Ref Range Status   07/17/2019 43.7 40.0 - 54.0 % Final     MCV   Date Value Ref Range Status   07/17/2019 96 80 - 100 fL Final     MCH   Date Value Ref Range Status   07/17/2019 32.3 27.0 - 34.0 pg Final     MCHC   Date Value Ref Range Status   07/17/2019 33.7 32.0 - 36.0 g/dL Final     RDW   Date Value Ref Range Status   07/17/2019 11.9 11.0 - 14.5 % Final     Platelets   Date Value Ref Range Status   07/17/2019 141 140 - 440 thou/uL Final      MPV   Date Value Ref Range Status   07/17/2019 7.2 7.0 - 10.0 fL Final                Passed - PCP or prescribing provider visit in past 12 months       Last office visit with prescriber/PCP: 2/27/2018 Osiel Olson CNP OR same dept: Visit date not found OR same specialty: Visit date not found  Last physical: Visit date not found Last MTM visit: Visit date not found   Next visit within 3 mo: Visit date not found  Next physical within 3 mo: Visit date not found  Prescriber OR PCP: Osiel Olson CNP  Last diagnosis associated with med order: 1. Seizure (H)  - DILANTIN EXTENDED 100 mg ER capsule [Pharmacy Med Name: DILANTIN 100MG CAPSULES]; TAKE 3 CAPSULES BY MOUTH ONCE DAILY  Dispense: 90 capsule; Refill: 0    If protocol passes may refill for 12 months if within 3 months of last provider visit (or a total of 15 months).

## 2021-06-03 VITALS — BODY MASS INDEX: 21.74 KG/M2 | WEIGHT: 143 LBS

## 2021-06-03 VITALS — BODY MASS INDEX: 21.01 KG/M2 | HEIGHT: 68 IN | WEIGHT: 138.6 LBS

## 2021-06-04 VITALS
HEART RATE: 64 BPM | SYSTOLIC BLOOD PRESSURE: 110 MMHG | WEIGHT: 144 LBS | DIASTOLIC BLOOD PRESSURE: 62 MMHG | BODY MASS INDEX: 21.82 KG/M2 | HEIGHT: 68 IN

## 2021-06-04 NOTE — PROGRESS NOTES
Assessment:   1. Annual physical exam  Generally healthy 59-year-old male.  Chronic medical conditions discussed today.    2. Healthcare maintenance  Colonoscopy due in 2022.  Fasting LDL check today.  Check PSA.  We reviewed his CBC and CMP from July this last year.  He will receive his influenza vaccine today    3. Nonintractable epilepsy without status epilepticus, unspecified epilepsy type (H)  Stable on Dilantin.  Had PCP check Dilantin levels this past July    4. Need for influenza vaccination  - Influenza, Recombinant, Inj, Quadrivalent, PF, 18+YRS         Plan:     Patient Instructions   Flu shot today.    Call your insurance company and inquire about the Shingrix vaccine for shingles.    We will check LDL cholesterol and PSA today.    Labs from this past July reviewed and found to be normal.    Dilantin labs from last July normal.    Weight looks good.    Vital signs look good.    Colonoscopy due in 2022.    Follow-up in 1 year, sooner if needed.      Subjective:     Here for physical exam. Chart reviewed       Past Medical History:   Diagnosis Date     Benign Polyps Of The Large Intestine     Created by [employer] [facility] Annotation: Apr 20 2011 10:27AM - Debora Ring: colonoscopy  done 4/15/11      Epilepsy     Created by Conversion      Past Surgical History:   Procedure Laterality Date     INTRACRANIAL ANEURYSM REPAIR       Penicillins and Phenytoin sodium extended  Family History   Problem Relation Age of Onset     Diabetes Mother      Stroke Mother      Hypertension Mother      Diabetes Father      Social History     Socioeconomic History     Marital status:      Spouse name: Not on file     Number of children: Not on file     Years of education: Not on file     Highest education level: Not on file   Occupational History     Not on file   Social Needs     Financial resource strain: Not on file     Food insecurity:     Worry: Not on file     Inability: Not on file     Transportation  "needs:     Medical: Not on file     Non-medical: Not on file   Tobacco Use     Smoking status: Never Smoker     Smokeless tobacco: Never Used   Substance and Sexual Activity     Alcohol use: Yes     Alcohol/week: 1.0 - 2.0 standard drinks     Types: 1 - 2 Cans of beer per week     Drug use: No     Sexual activity: Yes     Partners: Female     Birth control/protection: None   Lifestyle     Physical activity:     Days per week: Not on file     Minutes per session: Not on file     Stress: Not on file   Relationships     Social connections:     Talks on phone: Not on file     Gets together: Not on file     Attends Buddhist service: Not on file     Active member of club or organization: Not on file     Attends meetings of clubs or organizations: Not on file     Relationship status: Not on file     Intimate partner violence:     Fear of current or ex partner: Not on file     Emotionally abused: Not on file     Physically abused: Not on file     Forced sexual activity: Not on file   Other Topics Concern     Not on file   Social History Narrative     Not on file         Review of Systems  Please see form B           Objective:     Vitals:    12/13/19 1507   BP: 110/62   Pulse: 64   Weight: 144 lb (65.3 kg)   Height: 5' 8\" (1.727 m)       Physical  General Appearance: Alert, cooperative, no distress, appears stated age  Head: Normocephalic, without obvious abnormality, atraumatic  Eyes: PERRL, fundi not observed, EOM's intact  Ears: Normal TM's and external ear canals bilateral  Nose: No abnormalities noted  Mouth and Throat: Normal appearance   Neck: Supple, symmetrical, trachea midline, no adenopathy or thyromegally,  no tenderness/mass/nodules; no carotid bruit or JVD  Back: no CVA tenderness or spinous process pain  Lungs: Clear to auscultation bilaterally, good air movent  Heart: Regular rate and rhythm, S1 and S2 normal, no murmur, rub, or gallop,  Abdomen: Soft, non-tender, no masses, no organomegaly  Genitourinary:. "  No hernias   Rectal exam:    Musculoskeletal: No gross abnormalities    Extremities: Extremities normal, atraumatic, no cyanosis or edema, pulses 2/4 and symmetric  Skin:  no  worrisome lesions noted  Lymph nodes: Cervical, supraclavicular, groin, and axillary nodes normal  Neurologic: CN2-12 intact, normal sensation, DTRs 2/4 and symmetric.   Psychiatric:  normal mood and affect.      Current Outpatient Medications   Medication Sig Dispense Refill     DILANTIN EXTENDED 100 mg ER capsule TAKE 3 CAPSULES BY MOUTH ONCE DAILY 270 capsule 3     No current facility-administered medications for this visit.

## 2021-06-04 NOTE — PATIENT INSTRUCTIONS - HE
Flu shot today.    Call your insurance company and inquire about the Shingrix vaccine for shingles.    We will check LDL cholesterol and PSA today.    Labs from this past July reviewed and found to be normal.    Dilantin labs from last July normal.    Weight looks good.    Vital signs look good.    Colonoscopy due in 2022.    Follow-up in 1 year, sooner if needed.

## 2021-06-08 NOTE — PROGRESS NOTES
"Zaki Sultana is a 59 y.o. male who is being evaluated via a billable telephone visit.      The patient has been notified of following:     \"This telephone visit will be conducted via a call between you and your physician/provider. We have found that certain health care needs can be provided without the need for a physical exam.  This service lets us provide the care you need with a short phone conversation.  If a prescription is necessary we can send it directly to your pharmacy.  If lab work is needed we can place an order for that and you can then stop by our lab to have the test done at a later time.    Telephone visits are billed at different rates depending on your insurance coverage. During this emergency period, for some insurers they may be billed the same as an in-person visit.  Please reach out to your insurance provider with any questions.    If during the course of the call the physician/provider feels a telephone visit is not appropriate, you will not be charged for this service.\"    Patient has given verbal consent to a Telephone visit? Yes        Assessment/Plan:  1. Nonintractable epilepsy without status epilepticus, unspecified epilepsy type (H)  58 yo male with h/o epilepsy is taking Dilantin  mg daily for many years. He did not have seizures for many years. He is doing well and does not have any acute complaints. We reviewed the blood work from the visit in December. He will come to see me for a physical in December 2020.        Phone call duration:  5 minutes    Joycelyn Quan"

## 2021-06-11 NOTE — TELEPHONE ENCOUNTER
RN cannot approve Refill Request    RN can NOT refill this medication Protocol failed and NO refill given. Last office visit: 6/21/2019 Debora Ring MD Last Physical: 7/17/2019 Last MTM visit: Visit date not found Last visit same specialty: Visit date not found.  Next visit within 3 mo: Visit date not found  Next physical within 3 mo: Visit date not found      Kalie Jones, [facility] Triage/Med Refill 9/16/2020    Requested Prescriptions   Pending Prescriptions Disp Refills     DILANTIN EXTENDED 100 mg ER capsule [Pharmacy Med Name: DILANTIN 100MG CAPSULES] 270 capsule 3     Sig: TAKE 3 CAPSULES BY MOUTH ONCE DAILY       Phenytoin Refill Protocol Failed - 9/14/2020  1:52 PM        Failed - LFT or AST or ALT in last 12 months     Albumin   Date Value Ref Range Status   07/17/2019 4.2 3.5 - 5.0 g/dL Final     Bilirubin, Total   Date Value Ref Range Status   07/17/2019 0.3 0.0 - 1.0 mg/dL Final     Alkaline Phosphatase   Date Value Ref Range Status   07/17/2019 132 (H) 45 - 120 U/L Final     AST   Date Value Ref Range Status   07/17/2019 23 0 - 40 U/L Final     ALT   Date Value Ref Range Status   07/17/2019 30 0 - 45 U/L Final     Protein, Total   Date Value Ref Range Status   07/17/2019 6.9 6.0 - 8.0 g/dL Final                Failed - CBC w/o diff (Hemogram 2) in last year      WBC   Date Value Ref Range Status   07/17/2019 4.4 4.0 - 11.0 thou/uL Final     RBC   Date Value Ref Range Status   07/17/2019 4.57 4.40 - 6.20 mill/uL Final     Hemoglobin   Date Value Ref Range Status   07/17/2019 14.8 14.0 - 18.0 g/dL Final     Hematocrit   Date Value Ref Range Status   07/17/2019 43.7 40.0 - 54.0 % Final     MCV   Date Value Ref Range Status   07/17/2019 96 80 - 100 fL Final     MCH   Date Value Ref Range Status   07/17/2019 32.3 27.0 - 34.0 pg Final     MCHC   Date Value Ref Range Status   07/17/2019 33.7 32.0 - 36.0 g/dL Final     RDW   Date Value Ref Range Status   07/17/2019 11.9 11.0 - 14.5 % Final      Platelets   Date Value Ref Range Status   07/17/2019 141 140 - 440 thou/uL Final     MPV   Date Value Ref Range Status   07/17/2019 7.2 7.0 - 10.0 fL Final                Failed - Free phenytoin level in last 12 months     No results found for: PHENYTOIFREE          Failed - Folate level in last 12 months     No results found for: FOLATE          Passed - PCP or prescribing provider visit in past 12 months       Last office visit with prescriber/PCP: 6/21/2019 Debora Ring MD OR same dept: Visit date not found OR same specialty: Visit date not found  Last physical: 7/17/2019 Last MTM visit: Visit date not found   Next visit within 3 mo: Visit date not found  Next physical within 3 mo: Visit date not found  Prescriber OR PCP: Debora Ring MD  Last diagnosis associated with med order: 1. Seizure (H)  - DILANTIN EXTENDED 100 mg ER capsule [Pharmacy Med Name: DILANTIN 100MG CAPSULES]; TAKE 3 CAPSULES BY MOUTH ONCE DAILY  Dispense: 270 capsule; Refill: 3    If protocol passes may refill for 12 months if within 3 months of last provider visit (or a total of 15 months).

## 2021-06-16 NOTE — PROGRESS NOTES
Clinic Note    Assessment:     Assessment and Plan:    1. Medication management  I reviewed up-to-date and saw that there are some requirements as far as routine blood monitoring in the context of his chronic Dilantin usage.  Therefore, we will check a CBC, CMP, and phenytoin level today.  I reviewed his prior vitamin D levels as well-he informed me that he does not routinely take his vitamin D supplement as originally prescribed by Dr. Ring.  I told him we will go ahead and recheck a vitamin D level today.  Patient is asymptomatic and feeling well.  Of note, the patient told me that he needs to take brand name phenytoin, as when he took generic brand in the past it caused him to have seizures.    We will get the patient scheduled for a CPE with his PCP before he leaves today.    - HM2(CBC w/o Differential)  - Comprehensive Metabolic Panel  - Phenytoin (Dilantin )  - Vitamin D, Total (25-Hydroxy)         Patient Instructions   So we refilled your medications today.     We will call you with the results of your labs test from today. We will let you know if there will be any changes to your medications              Subjective:      Patient comes the clinic today for a medication check.    He called into the clinic about 10 days ago after being told by his pharmacy that he could not have a refill of his Dilantin unless he had a scheduled physical with his PCP.  Chart review shows that the patient's last annual exam was 2/24/2016, at that time a CBC vitamin D and phenytoin level were all drawn.  Labs were normal with the exception of his vitamin D which resulted as 9.4, patient was asked to take 2000 international units of vitamin D 3 OTC daily.    Today, patient states that he is not under the impression that he has epilepsy per se.  Patient states that he had 2 aneurysms treated intraoperatively in the 1970s as a child and was placed on Dilantin to treat intermittent seizures.  Patient does say that in the past  when he has gone 3 days without taking his medications he has had seizures.  Patient also states that when he takes generic brand medication he will have seizures.    Patient states that he has not had seizures for quite some time.  He feels asymptomatic today, has been feeling healthy as of late.  No chest pain or shortness of breath.  No fevers.  No headaches or dizziness.  No neurological sequela reported.    The following portions of the patient's history were reviewed and updated as appropriate: Allergies, medications, problem list, prior note.    Review of Systems:    Review is negative except for what is mentioned above.     Social Hx:    History   Smoking Status     Never Smoker   Smokeless Tobacco     Not on file         Objective:     Vitals:    02/27/18 0806   BP: 92/70   Pulse: 74   Temp: 98.4  F (36.9  C)   SpO2: 99%   Weight: 151 lb 9.6 oz (68.8 kg)       Exam:    General: No apparent distress. Calm. Alert and Oriented X3. Pt behavior is appropriate.  Head:Atraumatic. Normocephalic, non-tender to palpation  Neck: Supple. No JVD. Full ROM. No adenopathy  Eyes: PERRL, No discharge. No strabismus. No nystagmus.  Ears: TMs pearly gray with landmarks visible.   Nose/Mouth/Throat: Patent nares, no oral lesions, pharynx clear and without exudate. Uvula mid-line. Nasal septum mid-line. Clear turbinates.   Lymph: No axillar or cervical adenopathy.   Chest/Lungs: Normal chest wall, clear to auscultation, normal respiratory effort and rate.   Heart/Pulses: Regular rate and rhythm, strong and equal radial pulses, no murmurs. Capillary refill <2 seconds. No edema.   Abdomen: Soft, no palpable masses. No hepatosplenomegaly, no tenderness with palpation noted. Bowel sounds active in all quadrants. No increased tympany.   Genitalia: Not examined.   Musculoskeletal: No CVA tenderness with palpation. Good ROM with extremities.   Neurologic: Interactive, alert, no focal findings, CNs intact.   Skin: Warm, dry. Normal  hair pattern. Free of lesions. Normal skin turgor.       Patient Active Problem List   Diagnosis     Epilepsy     Current Outpatient Prescriptions   Medication Sig Dispense Refill     phenytoin (DILANTIN) 100 MG ER capsule Take 3 capsules (300 mg total) by mouth daily. Due for a physical exam 90 capsule 0     No current facility-administered medications for this visit.        Total time spent with patient was 20 minutes with >50% of time spent in face-to-face counseling regarding the above plan       Osiel Olson CNP (Rob)    2/27/2018

## 2021-06-19 NOTE — LETTER
Letter by Debora Ring MD at      Author: Debora Ring MD Service: -- Author Type: --    Filed:  Encounter Date: 11/8/2019 Status: Signed         Date:   11-8-2019      Dear Jair Sultana,    Our records indicate that you missed your most recent appointment with Provider on September 20 2019 and November 8,.   We were looking forward seeing you on your last appointment.   We understand if this was due to unforeseen circumstances or a simple oversight.      I hope we can help you reschedule this appointment at a time with the least likelihood of conflict in your schedule.    In the future if you are unable to keep your appointment, we would appreciate a call 24 hours in advance from you so that we can cancel your appointment and use that time for another patient.    We are very interested in your health care and hope to hear from you soon.  If you have any questions,  please contact our clinic at [phone].    Sincerely,      Debora Ring  Providers name

## 2021-06-19 NOTE — PROGRESS NOTES
Assessment:     Healthy male exam.   All preventive exams are up-to-date. DXA - on phenytoin for years.  Non Fasting labs will be done today.        This note has been dictated using voice recognition software. Any grammatical or context distortions are unintentional and inherent to the software      1. Health care maintenance  Comprehensive Metabolic Panel    HM2(CBC w/o Differential)    Urinalysis    Vitamin D, Total (25-Hydroxy)    Phenytoin (Dilantin )    PSA (Prostatic-Specific Antigen), Annual Screen    DXA Bone Density Scan   2. Seizure (H)  Comprehensive Metabolic Panel    HM2(CBC w/o Differential)    Urinalysis    Vitamin D, Total (25-Hydroxy)    phenytoin (DILANTIN) 100 MG ER capsule    Phenytoin (Dilantin )    PSA (Prostatic-Specific Antigen), Annual Screen    DXA Bone Density Scan   3. Skin lesion  Ambulatory referral to Dermatology       There are no Patient Instructions on file for this visit.          Plan:     Return in about 1 year (around 7/3/2019) for Annual physical.    Subjective:     Chief Complaint   Patient presents with     Annual Exam     Colonoscopy done 6/20/17, Phenytoin recheck     Zaki Sultana is a 57 y.o. male who presents for an annual exam. No acute issues.    Healthy Habits:   Regular Exercise: Yes  Sunscreen Use: Yes  Healthy Diet: Yes  Dental Visits Regularly: Yes  Seat Belt: Yes  Immunization status: up to date and documented.    Social History     Social History     Marital status:      Spouse name: N/A     Number of children: N/A     Years of education: N/A     Occupational History     Not on file.     Social History Main Topics     Smoking status: Never Smoker     Smokeless tobacco: Never Used     Alcohol use 0.6 - 1.2 oz/week     1 - 2 Cans of beer per week     Drug use: No     Sexual activity: Yes     Partners: Female     Birth control/ protection: None     Other Topics Concern     Not on file     Social History Narrative       Family History   Problem  "Relation Age of Onset     Diabetes Mother      Stroke Mother      Hypertension Mother      Diabetes Father        Health Maintenance   Topic Date Due     INFLUENZA VACCINE RULE BASED (1) 08/01/2018     ADVANCE DIRECTIVES DISCUSSED WITH PATIENT  02/24/2021     COLONOSCOPY  06/20/2022     TD 18+ HE  05/04/2025     TDAP ADULT ONE TIME DOSE  Completed       Patient Active Problem List   Diagnosis     Epilepsy       Current Outpatient Prescriptions on File Prior to Visit   Medication Sig Dispense Refill     [DISCONTINUED] phenytoin (DILANTIN) 100 MG ER capsule Take 3 capsules (300 mg total) by mouth daily. Due for a physical exam (Patient taking differently: Take 300 mg by mouth daily. CANNOT HAVE GENERIC) 90 capsule 3     No current facility-administered medications on file prior to visit.        Review of Systems  A 12 point comprehensive review of systems was negative except as noted in HPI.         Objective:      /58  Pulse 68  Ht 5' 6.5\" (1.689 m)  Wt 145 lb 14.4 oz (66.2 kg)  BMI 23.2 kg/m2  Body mass index is 23.2 kg/(m^2).  Physical Exam:  General Appearance: Alert, cooperative, no distress, appears stated age.  Head: Normocephalic, without obvious abnormality, atraumatic  Eyes: PERRL, conjunctiva/corneas clear, EOM's intact  Ears: Normal TM's and external ear canals, both ears  Nose: Nares normal, septum midline,mucosa normal, no drainage  Throat: Lips, mucosa, and tongue normal; teeth and gums normal  Neck: Supple, symmetrical, trachea midline, no adenopathy;  thyroid: not enlarged, symmetric, no tenderness/mass/nodules; no carotid bruit or JVD  Back: Symmetric, no curvature, ROM normal, no CVA tenderness.  Lungs: Clear to auscultation bilaterally, respirations unlabored.  Breasts: No breast masses, tenderness, asymmetry, or nipple discharge.  Heart: Regular rate and rhythm, S1 and S2 normal, no murmur, rub, or gallop.  Abdomen: Soft, non-tender, bowel sounds active all four quadrants,  no masses, no " organomegaly.  Pelvic:normal male genitalia, no palpable scrotal masses, prostate exam normal.  Extremities: Extremities normal, atraumatic, no cyanosis or edema.  Skin: Skin color, texture, turgor normal, no rashes, has dry patch lesion on the right palm and multiple hyperpigmented spots.  Lymph nodes: Cervical, supraclavicular, and axillary nodes normal.  Neurologic: No focal neurological findings.

## 2021-06-23 NOTE — TELEPHONE ENCOUNTER
Needing refill of Dilantin. Failed protocol from July, (barely out of range, result 9.9 range is 10-20.) so nursing unable to fill.     Dr Ring on call. Will call her. Discussed with Dr. Ring who ordered to please refill. Patient due to see her in July for physical so will fill until then.   Previous:     phenytoin (DILANTIN) 100 MG ER capsule Take 3 capsules (300 mg total) by mouth daily. Due for a physical exam (Patient taking differently: Take 300 mg by mouth daily. CANNOT HAVE GENERIC)

## 2021-06-29 ENCOUNTER — COMMUNICATION - HEALTHEAST (OUTPATIENT)
Dept: SCHEDULING | Facility: CLINIC | Age: 61
End: 2021-06-29

## 2021-06-29 DIAGNOSIS — R56.9 SEIZURE (H): ICD-10-CM

## 2021-06-29 RX ORDER — PHENYTOIN SODIUM 100 MG/1
CAPSULE, EXTENDED RELEASE ORAL
Qty: 270 CAPSULE | Refills: 3 | Status: SHIPPED | OUTPATIENT
Start: 2021-06-29

## 2021-07-03 NOTE — ADDENDUM NOTE
Addendum Note by Venancio Deng MLT at 7/3/2018  5:32 PM     Author: Venancio Deng MLT Service: -- Author Type:     Filed: 7/3/2018  5:32 PM Encounter Date: 7/3/2018 Status: Signed    : Venancio Deng MLT ()    Addended by: VENANCIO DENG on: 7/3/2018 05:32 PM        Modules accepted: Orders

## 2021-07-03 NOTE — ADDENDUM NOTE
Addendum Note by Max Olson CNP at 2/28/2018 12:21 PM     Author: Max Olson CNP Service: -- Author Type: Nurse Practitioner    Filed: 2/28/2018 12:21 PM Encounter Date: 2/27/2018 Status: Signed    : Max Olson CNP (Nurse Practitioner)    Addended by: MAX OLSON on: 2/28/2018 12:21 PM        Modules accepted: Orders

## 2021-07-04 NOTE — TELEPHONE ENCOUNTER
Telephone Encounter by Jaime Beard, RN at 6/29/2021  2:58 PM     Author: Jaime Beard, RN Service: -- Author Type: Registered Nurse    Filed: 6/29/2021  2:59 PM Encounter Date: 6/29/2021 Status: Signed    : Jaime Beard, RN (Registered Nurse)       RN cannot approve Refill Request    RN can NOT refill this medication Protocol failed and NO refill given and   Patient request early refill. Medication last filled 9/16/20 for qty 270 refill 3. Provider to advise on request. . Last office visit: 6/21/2019 Debora Ring MD Last Physical: 7/17/2019 Last MTM visit: Visit date not found Last visit same specialty: Visit date not found.  Next visit within 3 mo: Visit date not found  Next physical within 3 mo: Visit date not found      Jaime Beard, [facility] Triage/Med Refill 6/29/2021    Requested Prescriptions   Pending Prescriptions Disp Refills   ? DILANTIN EXTENDED 100 mg ER capsule [Pharmacy Med Name: DILANTIN 100MG CAPSULES] 270 capsule 3     Sig: TAKE 3 CAPSULES BY MOUTH ONCE DAILY       Phenytoin Refill Protocol Failed - 6/29/2021  2:33 PM        Failed - LFT or AST or ALT in last 12 months     Albumin   Date Value Ref Range Status   07/17/2019 4.2 3.5 - 5.0 g/dL Final     Bilirubin, Total   Date Value Ref Range Status   07/17/2019 0.3 0.0 - 1.0 mg/dL Final     Alkaline Phosphatase   Date Value Ref Range Status   07/17/2019 132 (H) 45 - 120 U/L Final     AST   Date Value Ref Range Status   07/17/2019 23 0 - 40 U/L Final     ALT   Date Value Ref Range Status   07/17/2019 30 0 - 45 U/L Final     Protein, Total   Date Value Ref Range Status   07/17/2019 6.9 6.0 - 8.0 g/dL Final                Failed - CBC w/o diff (Hemogram 2) in last year      WBC   Date Value Ref Range Status   07/17/2019 4.4 4.0 - 11.0 thou/uL Final     RBC   Date Value Ref Range Status   07/17/2019 4.57 4.40 - 6.20 mill/uL Final     Hemoglobin   Date Value Ref Range Status   07/17/2019 14.8 14.0 - 18.0 g/dL Final     Hematocrit   Date  Value Ref Range Status   07/17/2019 43.7 40.0 - 54.0 % Final     MCV   Date Value Ref Range Status   07/17/2019 96 80 - 100 fL Final     MCH   Date Value Ref Range Status   07/17/2019 32.3 27.0 - 34.0 pg Final     MCHC   Date Value Ref Range Status   07/17/2019 33.7 32.0 - 36.0 g/dL Final     RDW   Date Value Ref Range Status   07/17/2019 11.9 11.0 - 14.5 % Final     Platelets   Date Value Ref Range Status   07/17/2019 141 140 - 440 thou/uL Final     MPV   Date Value Ref Range Status   07/17/2019 7.2 7.0 - 10.0 fL Final                Failed - Free phenytoin level in last 12 months     No results found for: PHENYTOIFREE          Failed - Folate level in last 12 months     No results found for: FOLATE          Failed - PCP or prescribing provider visit in past 12 months       Last office visit with prescriber/PCP: 6/21/2019 Debora Ring MD OR same dept: Visit date not found OR same specialty: Visit date not found  Last physical: 7/17/2019 Last MTM visit: Visit date not found   Next visit within 3 mo: Visit date not found  Next physical within 3 mo: Visit date not found  Prescriber OR PCP: Debora Ring MD  Last diagnosis associated with med order: 1. Seizure (H)  - DILANTIN EXTENDED 100 mg ER capsule [Pharmacy Med Name: DILANTIN 100MG CAPSULES]; TAKE 3 CAPSULES BY MOUTH ONCE DAILY  Dispense: 270 capsule; Refill: 3    If protocol passes may refill for 12 months if within 3 months of last provider visit (or a total of 15 months).

## 2021-09-12 ENCOUNTER — HEALTH MAINTENANCE LETTER (OUTPATIENT)
Age: 61
End: 2021-09-12

## 2022-06-18 ENCOUNTER — HEALTH MAINTENANCE LETTER (OUTPATIENT)
Age: 62
End: 2022-06-18

## 2022-09-14 DIAGNOSIS — R56.9 SEIZURE (H): ICD-10-CM

## 2022-09-14 RX ORDER — PHENYTOIN SODIUM 100 MG/1
CAPSULE, EXTENDED RELEASE ORAL
Qty: 270 CAPSULE | Refills: 3 | Status: SHIPPED | OUTPATIENT
Start: 2022-09-14

## 2022-10-30 ENCOUNTER — HEALTH MAINTENANCE LETTER (OUTPATIENT)
Age: 62
End: 2022-10-30

## 2023-07-01 ENCOUNTER — HEALTH MAINTENANCE LETTER (OUTPATIENT)
Age: 63
End: 2023-07-01